# Patient Record
Sex: FEMALE | Race: WHITE | URBAN - METROPOLITAN AREA
[De-identification: names, ages, dates, MRNs, and addresses within clinical notes are randomized per-mention and may not be internally consistent; named-entity substitution may affect disease eponyms.]

---

## 2023-09-21 ENCOUNTER — TELEPHONE (OUTPATIENT)
Dept: ORTHOPEDIC SURGERY | Age: 68
End: 2023-09-21

## 2023-09-21 NOTE — TELEPHONE ENCOUNTER
Needs to have rt tka checked fater fall 9/16 seen at [facility] er. Please call or advise.  She was told to keep it wrapped and see [facility] asap

## 2023-09-21 NOTE — TELEPHONE ENCOUNTER
Spoke with patient to schedule an appointment for 10/16. Told patient I could get her in sooner with Gera Farrell our PA she insisted that she only wanted to see [facility] and only come to [address] so I gave her the next available appointment. Yes

## 2023-10-16 ENCOUNTER — OFFICE VISIT (OUTPATIENT)
Dept: ORTHOPEDIC SURGERY | Age: 68
End: 2023-10-16
Payer: MEDICARE

## 2023-10-16 VITALS — BODY MASS INDEX: 41.99 KG/M2 | HEIGHT: 65 IN | WEIGHT: 252 LBS

## 2023-10-16 DIAGNOSIS — Z96.651 PRESENCE OF TOTAL KNEE JOINT PROSTHESIS, RIGHT: ICD-10-CM

## 2023-10-16 DIAGNOSIS — M25.562 ACUTE PAIN OF LEFT KNEE: ICD-10-CM

## 2023-10-16 DIAGNOSIS — Z09 SURGERY FOLLOW-UP: ICD-10-CM

## 2023-10-16 DIAGNOSIS — S80.02XA CONTUSION OF LEFT KNEE, INITIAL ENCOUNTER: ICD-10-CM

## 2023-10-16 DIAGNOSIS — S80.01XA CONTUSION OF RIGHT KNEE, INITIAL ENCOUNTER: ICD-10-CM

## 2023-10-16 DIAGNOSIS — M25.561 ACUTE PAIN OF RIGHT KNEE: Primary | ICD-10-CM

## 2023-10-16 DIAGNOSIS — Z96.652 TOTAL KNEE REPLACEMENT STATUS, LEFT: ICD-10-CM

## 2023-10-16 PROCEDURE — G8484 FLU IMMUNIZE NO ADMIN: HCPCS | Performed by: ORTHOPAEDIC SURGERY

## 2023-10-16 PROCEDURE — 99204 OFFICE O/P NEW MOD 45 MIN: CPT | Performed by: ORTHOPAEDIC SURGERY

## 2023-10-16 PROCEDURE — G8400 PT W/DXA NO RESULTS DOC: HCPCS | Performed by: ORTHOPAEDIC SURGERY

## 2023-10-16 PROCEDURE — G8428 CUR MEDS NOT DOCUMENT: HCPCS | Performed by: ORTHOPAEDIC SURGERY

## 2023-10-16 PROCEDURE — G8419 CALC BMI OUT NRM PARAM NOF/U: HCPCS | Performed by: ORTHOPAEDIC SURGERY

## 2023-10-16 PROCEDURE — 4004F PT TOBACCO SCREEN RCVD TLK: CPT | Performed by: ORTHOPAEDIC SURGERY

## 2023-10-16 PROCEDURE — 1123F ACP DISCUSS/DSCN MKR DOCD: CPT | Performed by: ORTHOPAEDIC SURGERY

## 2023-10-16 PROCEDURE — 3017F COLORECTAL CA SCREEN DOC REV: CPT | Performed by: ORTHOPAEDIC SURGERY

## 2023-10-16 PROCEDURE — 1090F PRES/ABSN URINE INCON ASSESS: CPT | Performed by: ORTHOPAEDIC SURGERY

## 2023-10-16 NOTE — PROGRESS NOTES
Name: Anna Donnelly  YOB: 1955  Gender: female  MRN: [id number]    CC: Bilateral knee pain following a fall status post remote total knee arthroplasty    HPI: Anna Donnelly is a 77 y.o. female who presents with concerns with bilateral knee pain following a hard fall about 4 weeks ago. She underwent left total knee arthroplasty under my care in approximately 2006. Her right knee done about the same year unfortunately loosened and required revision to a DePuy stemmed sleeve TC 3 revision construct in 2016. She has fortunately done very well with that surgery following a reasonable recovery. She is limited and does ambulate with the use of a walker and has done so since her original surgeries more or less. 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 She did go to the emergency room near her home and had x-rays but was not advised to discontinue her blood thinner. Her symptoms have improved to some degree but given her previous history of loosening her right total knee arthroplasty she came in to make sure things were okay and to get guidance on further recommendations. History was obtained from patient and her sister accompanies her. ROS/Meds/PSH/PMH/FH/SH: I personally reviewed the patients standard intake form. Below are the pertinents    Tobacco:  has no history on file for tobacco use. No past medical history on file. No past surgical history on file. Physical Examination:  Physical exam: On examination of the patient's gait there is the patient ambulates with a walker    On examination while standing there is decreased flexion in the lumbosacral spine without acute list or spasm. While seated ,  the Bilateral hip is examined there is full range of motion without obvious issue . Yumiko Sutton      On examination of the  Right knee :ROM is 0 to 125  the extensor mechanism is